# Patient Record
Sex: FEMALE | Race: WHITE | NOT HISPANIC OR LATINO | Employment: PART TIME | URBAN - METROPOLITAN AREA
[De-identification: names, ages, dates, MRNs, and addresses within clinical notes are randomized per-mention and may not be internally consistent; named-entity substitution may affect disease eponyms.]

---

## 2023-08-08 ENCOUNTER — OFFICE VISIT (OUTPATIENT)
Dept: OBSTETRICS AND GYNECOLOGY | Facility: CLINIC | Age: 23
End: 2023-08-08

## 2023-08-08 VITALS — WEIGHT: 121.4 LBS

## 2023-08-08 DIAGNOSIS — N92.6 ABNORMAL MENSES: ICD-10-CM

## 2023-08-08 DIAGNOSIS — N91.2 ABSENT MENSES: Primary | ICD-10-CM

## 2023-08-08 LAB
B-HCG UR QL: NEGATIVE
EXPIRATION DATE: NORMAL
INTERNAL NEGATIVE CONTROL: NORMAL
INTERNAL POSITIVE CONTROL: NORMAL
Lab: NORMAL

## 2023-08-08 NOTE — PROGRESS NOTES
Chief Complaint  New Gyn (Patient is here today after experiencing a possible miscarriage in . States that she had very abn bleeding in  with a lot of heavy bleeding for 2 weeks- at one point saw something in the toilet that resembled a large clot. Patient say that she did not take a pregnancy test before hand but did take one after that happen (2-3 hours later) and it was negative. )    Subjective        Sharon El presents to [facility]  History of Present Illness  Patient here for evaluation of what she thinks could have been a miscarriage.  Patient reports she had a very unusual menstrual cycle in .  She says she had bleeding for about 2 weeks.  At the end of this she had a very heavy episode of bleeding with the passage of some clots and/or tissue.  She did take a pregnancy test at that time which was negative.  She called the office to get an appointment, but says that this was the soonest appointment that she could get.  In the interim, that episode of bleeding has resolved and she actually had a normal period in July.  She is currently in her usual state of health.  She and her partner are considering trying to conceive.  They are not actively preventing pregnancy.  She does not currently have health insurance at this time.    Menstrual History:  OB History          0    Para   0    Term   0       0    AB   0    Living   0         SAB   0    IAB   0    Ectopic   0    Molar   0    Multiple   0    Live Births   0                 Patient's last menstrual period was 2023 (exact date).     Past Medical History:   Diagnosis Date    Heart murmur      History reviewed. No pertinent surgical history.    Social History     Tobacco Use    Smoking status: Never    Smokeless tobacco: Never   Vaping Use    Vaping Use: Never used   Substance Use Topics    Alcohol use: Yes     Comment: socially    Drug use: Never     Family History   Problem Relation Age of Onset     Breast cancer Neg Hx     Ovarian cancer Neg Hx     Uterine cancer Neg Hx     Colon cancer Neg Hx      No current outpatient medications on file prior to visit.     No current facility-administered medications on file prior to visit.     No Known Allergies    Review of systems:  Constitutional: No fevers, chills, sweats   Eye: No recent visual problems, denies blurry vision   HEENT: No ear pain, nasal congestion, sore throat, voice changes   Respiratory: No shortness of breath, cough, pain on breathing   Cardiovascular: No Chest pain, palpitations   Gastrointestinal: No nausea, vomiting, diarrhea, constipation   Genitourinary: No hematuria, dysuria, lesions on genitalia   Hema/Lymph: Negative for bruising, no edema   Endocrine: Negative for excessive thirst, excessive hunger, heat or cold intolerance   Musculoskeletal: No joint pain, muscle pain, decreased range of motion   Integumentary: No rash, pruritus, abrasions, lesions   Neurologic: No weakness, numbness, headaches   Psychiatric: No anxiety, depression, mood changes       Objective   Vital Signs:  Wt 55.1 kg (121 lb 6.4 oz)   There is no height or weight on file to calculate BMI.             Physical Exam     Gen: No acute distress, awake and oriented times three  HENT: Normocephalic, atraumatic, Moist mucous membranes  Eyes: PERRLA, EOMI  Lungs: Normal work of breathing, lungs clear bilaterally  Rectal: Deferred  Skin: Warm and dry, no rashes  Psych: Good judgement and insight, normal affect and mood  Neuro: CN 2-12 intact, no gross deficits    Result Review :          Office Visit on 08/08/2023   Component Date Value Ref Range Status    HCG, Urine, QL 08/08/2023 Negative  Negative Final    Lot Number 08/08/2023 pas   Final    Internal Positive Control 08/08/2023 Passed  Positive, Passed Final    Internal Negative Control 08/08/2023 Passed  Negative, Passed Final    Expiration Date 08/08/2023 12/134/2024   Final              Assessment and Plan   Diagnoses  and all orders for this visit:    1. Absent menses (Primary)  -     POC Pregnancy, Urine    2. Abnormal menses    Patient had 1 abnormal menstrual cycle in June, but since then they have returned to normal.  She never had a positive pregnancy test at home.  We discussed options at this time of observation versus further investigation such as with ultrasound.  Patient prefers observation at this time since her most recent menstrual cycle was normal.  This is a reasonable option.  I advised the patient that if she were to have more problematic periods again she could always return for evaluation at that time.  I also recommend the patient start an over-the-counter prenatal vitamin as she and her  are potentially considering pregnancy.    I recommended Pap smear at this time.  The patient has never previously had a Pap smear.  She declines Pap smear today.  We also discussed the HPV vaccine.  The patient is uncertain of whether she had received the HPV vaccine when she was younger.  She says she does have access to her healthcare records when she was a teenager.  I advised her to check and see if she had the HPV vaccine.  If not, we will be happy to administer it here for her.  Discussed the importance of HPV vaccination for prevention of cervical dysplasia and cervical cancer.    I spent 30 minutes today on the care of this patient, including review of the chart and any pertinent prior imaging and labs, interview/exam of the patient, developing care plan, and counseling the patient on management options and excluding any time spent on other separate billable services such as performing procedures or test interpretation, if applicable.         Follow Up   Return if symptoms worsen or fail to improve.  Patient was given instructions and counseling regarding her condition or for health maintenance advice. Please see specific information pulled into the AVS if appropriate.

## 2023-08-10 ENCOUNTER — PATIENT ROUNDING (BHMG ONLY) (OUTPATIENT)
Dept: OBSTETRICS AND GYNECOLOGY | Facility: CLINIC | Age: 23
End: 2023-08-10

## 2023-08-10 ENCOUNTER — PATIENT MESSAGE (OUTPATIENT)
Dept: OBSTETRICS AND GYNECOLOGY | Facility: CLINIC | Age: 23
End: 2023-08-10

## 2023-08-10 NOTE — PROGRESS NOTES
My chart message has been sent to the patient for PATIENT ROUNDING with [facility].